# Patient Record
Sex: FEMALE | Race: BLACK OR AFRICAN AMERICAN | Employment: FULL TIME | URBAN - METROPOLITAN AREA
[De-identification: names, ages, dates, MRNs, and addresses within clinical notes are randomized per-mention and may not be internally consistent; named-entity substitution may affect disease eponyms.]

---

## 2018-10-09 ENCOUNTER — HOSPITAL ENCOUNTER (OUTPATIENT)
Dept: PHYSICAL THERAPY | Age: 23
Discharge: HOME | End: 2018-10-09
Payer: OTHER GOVERNMENT

## 2018-10-09 PROCEDURE — 97162 PT EVAL MOD COMPLEX 30 MIN: CPT

## 2018-10-09 PROCEDURE — 97110 THERAPEUTIC EXERCISES: CPT

## 2018-10-09 NOTE — PROGRESS NOTES
41 [facility] PHYSICAL THERAPY AT [address] [place] [address], [address] [place] [address] Road - Phone: [phone]  Fax: [phone] PLAN OF CARE / STATEMENT OF MEDICAL NECESSITY FOR PHYSICAL THERAPY SERVICES Patient Name: Christina Johnson : 1995 Treatment Diagnosis: Right knee pain Medical 
 Diagnosis: Knee pain, right [M25.561] Chondromalacia patellae, right knee [M22.41] Onset Date: 4 months ago Referral Source: Shona Og MD Start of [facility]): 10/9/2018 Prior Hospitalization: See medical history Provider #: [id number] Prior Level of Function: Pain free with all ADLs Comorbidities: None reported by patient Medications: Verified on Patient Summary List  
The Plan of Care and following information is based on the information from the initial evaluation.  
================================================================================== Assessment / key information: Pt is a 25 yo female that presents to PT with a chief complaint of R knee pain that began about 4 months with no known mechanism of injury. Pt states that about 8 months ago she began working a new job that requires her to be on her feet all the time walking which she states she wasn't used to. He/she presents with pain ranging from 3-10/10, located on the right knee on the anterolateral aspect. Pain is made worse with standing for long periods of time, sitting for long periods of time, walking and turning around with walking. , better with at night and when she can rest it. Other notable subjective information: Pt reports that it gets stiff and she has to move it around, pt reports that it clicks and pops frequently, reports that it has given out when descending stairs.  Notable examination finding: ROM WNL but had pain at end range knee flexion, hip abduction strength 4-/5, (+) mcmurrays, (+) thessaly test, hypermobility of bilateral patellas, decreased quad activation on the R LE, gross RLE strength 4+/5. Pt will benefit from PT interventions to address the aforementioned deficits and allow pt to return to PLOF. Eval Complexity: History MEDIUM  Complexity : 1-2 comorbidities / personal factors will impact the outcome/ POC ;  Examination  MEDIUM Complexity : 3 Standardized tests and measures addressing body structure, function, activity limitation and / or participation in recreation ; Presentation LOW Complexity : Stable, uncomplicated ;  Decision Making MEDIUM Complexity : FOTO score of 26-74; Overall Complexity MEDIUM  
================================================================================== Problem List: pain affecting function, decrease strength, impaired gait/ balance, decrease ADL/ functional abilitiies and decrease activity tolerance Treatment Plan may include any combination of the following: Therapeutic exercise, Therapeutic activities, Neuromuscular re-education, Physical agent/modality, Gait/balance training, Manual therapy, Patient education, Self Care training and Functional mobility training Patient / Family readiness to learn indicated by: asking questions, trying to perform skills and interestPersons(s) to be included in education: patient (P) Barriers to Learning/Limitations: no 
Measures taken:   
Patient Goal (s): \"strength in the knee and have no pain\" Patient self reported health status: good Rehabilitation Potential: good ? Short Term Goals: To be accomplished in  3  weeks: 1. Pt will be independent and compliant with HEP to decrease pain, increase ROM and return pt to PLOF. 2. Pt will demonstrate good quadriceps activation to show improved quad control 3. 
? Long Term Goals: To be accomplished in  6  weeks: 
1. Increase score on FOTO by > or = to 70 points to demo an increase in functional activity tolerance with the LE. 2. Pt will note < or = 1/10 pain with all mobility to improve comfort with ADLs. 3. Pt will be able to perform all duties at work without increase in pain to return to prior level of function 4. Pt will demonstrate gross RLE strength of 5-/5 to improve with ADLs and return to prior level of function Frequency / Duration:   Patient to be seen  2  times per week for 6  weeks: 
Patient / Caregiver education and instruction: self care, activity modification and exercises G-Codes (GP): - Therapist Signature: Heather Alfaro PT Date: 10/9/2018 Certification Period: - Time: 7:09 PM  
=========================================================================================== I certify that the above Physical Therapy Services are being furnished while the patient is under my care. I agree with the treatment plan and certify that this therapy is necessary. Physician Signature:       Date:      Time:  Please sign and return to [facility] or you may fax the signed copy to [phone] . Thank you.

## 2018-10-09 NOTE — PROGRESS NOTES
PHYSICAL THERAPY - DAILY TREATMENT NOTE Patient Name: Radha Cano        Date: 10/9/2018 : 1995    Patient  Verified: Pamela Zurita Visit #:     Insurance: Payor: LISA / Plan: Anaid Turpin / Product Type: Devan Sree / In time: 4:10 Out time: 5:00 Total Treatment Time: 50 Medicare Time Tracking (below) Total Timed Codes (min):  - 1:1 Treatment Time:  -  
 
TREATMENT AREA/ DIAGNOSIS = Knee pain, right [M25.561] Chondromalacia patellae, right knee [M22.41] SUBJECTIVE Pain Level (on 0 to 10 scale):  5  / 10 Medication Changes/New allergies or changes in medical history, any new surgeries or procedures? NO    If yes, update Summary List  
Subjective Functional Status/Changes:  []  No changes reported See Eval   
 
OBJECTIVE Modalities Rationale:     decrease pain to improve patient's ability to perform ADLs without pain 
 
 min [] Estim, type/location:    
                                 []  att     []  unatt     []  w/US     []  w/ice    []  w/heat  
 min []  Mechanical Traction: type/lbs   
               []  pro   []  sup   []  int   []  cont    []  before manual    []  after manual  
 min []  Ultrasound, settings/location:    
 min []  Iontophoresis w/ dexamethasone, location:   
                                           []  take home patch       []  in clinic  
10 min [x]  Ice     []  Heat    location/position: Right knee  
 min []  Vasopneumatic Device, press/temp:   
 min []  Other:   
[] Skin assessment post-treatment (if applicable):   
[]  intact    []  redness- no adverse reaction    
[]redness  adverse reaction:     
 
 
10 min Therapeutic Exercise:  [x]  See flow sheet Rationale:      increase strength to improve the patients ability to perform ADLs without pain Throughout treatment session min Patient Education:  Yes    [x] Reviewed HEP []  Progressed/Changed HEP based on:      
 
Other Objective/Functional Measures: 
 
See Eval  
 Post Treatment Pain Level (on 0 to 10) scale:   3  / 10 ASSESSMENT Assessment/Changes in Function:  
 
See Eval 
  
[]  See Progress Note/Recertification Patient will continue to benefit from skilled PT services to modify and progress therapeutic interventions, address functional mobility deficits and address strength deficits to attain remaining goals. Progress toward goals / Updated goals: 
 
See Eval  
 
PLAN [x]  Upgrade activities as tolerated YES Continue plan of care  
[]  Discharge due to :   
[]  Other:   
 
Therapist: Stacy SpringerPT Date: 10/9/2018 Time: 7:10 PM  
 
  
No future appointments.

## 2018-10-22 ENCOUNTER — HOSPITAL ENCOUNTER (OUTPATIENT)
Dept: PHYSICAL THERAPY | Age: 23
Discharge: HOME | End: 2018-10-22
Payer: OTHER GOVERNMENT

## 2018-10-22 PROCEDURE — 97110 THERAPEUTIC EXERCISES: CPT

## 2018-10-29 ENCOUNTER — HOSPITAL ENCOUNTER (OUTPATIENT)
Dept: PHYSICAL THERAPY | Age: 23
Discharge: HOME | End: 2018-10-29
Payer: OTHER GOVERNMENT

## 2018-10-29 PROCEDURE — 97110 THERAPEUTIC EXERCISES: CPT

## 2018-10-29 NOTE — PROGRESS NOTES
PHYSICAL THERAPY - DAILY TREATMENT NOTE Patient Name: Denise Jones        Date: 10/29/2018 : 1995    Patient  Verified: Cherelle Soliman Visit #:   3   of   12  Insurance: Payor: LISA / Plan: Dagoberto Newell 74 / Product Type: Benito Crisrusty / In time: 5:55 Out time: 6:40 Total Treatment Time: 45 Medicare Time Tracking (below) Total Timed Codes (min):  - 1:1 Treatment Time:  -  
TREATMENT AREA/ DIAGNOSIS = Pain in right knee [M25.561] Chondromalacia patellae, right knee [M22.41] SUBJECTIVE Pain Level (on 0 to 10 scale):  3  / 10 Medication Changes/New allergies or changes in medical history, any new surgeries or procedures? NO    If yes, update Summary List  
Subjective Functional Status/Changes:  []  No changes reported Pt reports that she was really sore after last visit. Pt states it finally went away and is back to her normal level now. OBJECTIVE Modalities Rationale:     decrease pain to improve patient's ability to perform ADLs without pain 
 
 min [] Estim, type/location:   
                                 []  att     []  unatt     []  w/US     []  w/ice    []  w/heat 
 min []  Mechanical Traction: type/lbs   
               []  pro   []  sup   []  int   []  cont    []  before manual    []  after manual  
 min []  Ultrasound, settings/location:    
 min []  Iontophoresis w/ dexamethasone, location:   
                                           []  take home patch       []  in clinic  
10 min [x]  Ice     []  Heat    location/position:   
 min []  Vasopneumatic Device, press/temp:   
 min []  Other:   
[] Skin assessment post-treatment (if applicable):   
[]  intact    []  redness- no adverse reaction    
[]redness  adverse reaction:     
35 min Therapeutic Exercise:  [x]  See flow sheet Rationale:      increase strength to improve the patients ability to perform ADLs without pain Throughout treatment session min Patient Education:  Yes    [x] Reviewed HEP 
 []  Progressed/Changed HEP based on: Other Objective/Functional Measures: 
 
Pt had no increase in pain during treatment session. Initiated PHE Post Treatment Pain Level (on 0 to 10) scale:   0  / 10 ASSESSMENT Assessment/Changes in Function:  
 
Pt had increased pain after last visit so all exercises were decreased in reps today to help with activity tolerance. Progress back to original next time if patient tolerated it well. 
  
[]  See Progress Note/Recertification Patient will continue to benefit from skilled PT services to modify and progress therapeutic interventions, address functional mobility deficits, address ROM deficits, address strength deficits and analyze and address soft tissue restrictions to attain remaining goals. Progress toward goals / Updated goals: 
Continue with current treatment plan PLAN [x]  Upgrade activities as tolerated YES Continue plan of care  
[]  Discharge due to :   
[]  Other:   
 
Therapist: Franklin Archibald PT Date: 10/29/2018 Time:   
 
  
Future Appointments Date Time Provider Kasia Cheney 11/5/2018  6:00 PM [facility] AT [facility]  
11/12/2018  6:00 PM [facility] AT [facility]  
11/19/2018  6:00 PM [facility] AT [facility]  
11/26/2018  6:00 PM [facility] AT [facility]

## 2018-11-05 ENCOUNTER — HOSPITAL ENCOUNTER (OUTPATIENT)
Dept: PHYSICAL THERAPY | Age: 23
Discharge: HOME | End: 2018-11-05
Payer: OTHER GOVERNMENT

## 2018-11-05 PROCEDURE — 97110 THERAPEUTIC EXERCISES: CPT

## 2018-11-05 NOTE — PROGRESS NOTES
PHYSICAL THERAPY - DAILY TREATMENT NOTE Patient Name: Alexsandra Soria        Date: 2018 : 1995    Patient  Verified: Urszula Severino Visit #:     Insurance: Payor: LISA / Plan: Opal Rosales / Product Type: Se Ramon / In time: 6:00 Out time: 6:50 Total Treatment Time: 50 Medicare Time Tracking (below) Total Timed Codes (min):  - 1:1 Treatment Time:  -  
TREATMENT AREA/ DIAGNOSIS = Pain in right knee [M25.561] Chondromalacia patellae, right knee [M22.41] SUBJECTIVE Pain Level (on 0 to 10 scale):  2  / 10 Medication Changes/New allergies or changes in medical history, any new surgeries or procedures? NO    If yes, update Summary List  
Subjective Functional Status/Changes:  []  No changes reported Pt reports that she is still feeling stiff in the R knee and that it hasnt changed much OBJECTIVE Modalities Rationale:     decrease inflammation and decrease pain to improve patient's ability to perform ADLs without pain 
 
 min [] Estim, type/location:   
                                 []  att     []  unatt     []  w/US     []  w/ice    []  w/heat 
 min []  Mechanical Traction: type/lbs   
               []  pro   []  sup   []  int   []  cont    []  before manual    []  after manual  
 min []  Ultrasound, settings/location:    
 min []  Iontophoresis w/ dexamethasone, location:   
                                           []  take home patch       []  in clinic  
10 min [x]  Ice     []  Heat    location/position: r knee  
 min []  Vasopneumatic Device, press/temp:   
 min []  Other:   
[] Skin assessment post-treatment (if applicable):   
[]  intact    []  redness- no adverse reaction    
[]redness  adverse reaction:     
40 min Therapeutic Exercise:  [x]  See flow sheet Rationale:      increase strength, improve coordination and improve balance to improve the patients ability to perform ADLs without pain Throughout treatment session min Patient Education:  Yes    [x] Reviewed HEP []  Progressed/Changed HEP based on: Other Objective/Functional Measures: 
 
Pt had no increase in pain during treatment session Post Treatment Pain Level (on 0 to 10) scale:   0  / 10 ASSESSMENT Assessment/Changes in Function:  
 
Pt had increased sway with balance activities []  See Progress Note/Recertification Patient will continue to benefit from skilled PT services to modify and progress therapeutic interventions, address functional mobility deficits and address strength deficits to attain remaining goals. Progress toward goals / Updated goals: 
Continue with current treatment plan PLAN [x]  Upgrade activities as tolerated YES Continue plan of care  
[]  Discharge due to :   
[]  Other:   
 
Therapist: Ulysses Files ,PT Date: 11/5/2018 Time: 6:06 PM  
 
  
Future Appointments Date Time Provider Kasia Cheney 11/12/2018  6:00 PM [facility] AT [facility]  
11/19/2018  6:00 PM [facility] AT [facility]  
[facility]26[facility]2018  [facility]

## 2018-11-12 ENCOUNTER — HOSPITAL ENCOUNTER (OUTPATIENT)
Dept: PHYSICAL THERAPY | Age: 23
Discharge: HOME | End: 2018-11-12
Payer: OTHER GOVERNMENT

## 2018-11-12 PROCEDURE — 97110 THERAPEUTIC EXERCISES: CPT

## 2018-11-12 NOTE — PROGRESS NOTES
PHYSICAL THERAPY - DAILY TREATMENT NOTE Patient Name: Clemencia Molina        Date: 2018 : 1995    Patient  Verified: Victor Hugo Lebron Visit #:     Insurance: Payor: LISA / Plan: Dagoberto Newell 74 / Product Type: Gayatri Hands / In time: 6:00 Out time: 6:55 Total Treatment Time: 54 Medicare Time Tracking (below) Total Timed Codes (min):  - 1:1 Treatment Time:  -  
TREATMENT AREA/ DIAGNOSIS = Pain in right knee [M25.561] Chondromalacia patellae, right knee [M22.41] SUBJECTIVE Pain Level (on 0 to 10 scale):  0  / 10 Medication Changes/New allergies or changes in medical history, any new surgeries or procedures? NO    If yes, update Summary List  
Subjective Functional Status/Changes:  []  No changes reported Pt reports that she isnt having as much stiffness lately but when she does its pretty bad. OBJECTIVE Modalities Rationale:     decrease inflammation to improve patient's ability to perform ADLs without pain 
 
 min [] Estim, type/location:   
                                 []  att     []  unatt     []  w/US     []  w/ice    []  w/heat 
 min []  Mechanical Traction: type/lbs   
               []  pro   []  sup   []  int   []  cont    []  before manual    []  after manual  
 min []  Ultrasound, settings/location:    
 min []  Iontophoresis w/ dexamethasone, location:   
                                           []  take home patch       []  in clinic  
10 min [x]  Ice     []  Heat    location/position: R knee  
 min []  Vasopneumatic Device, press/temp:   
 min []  Other:   
[] Skin assessment post-treatment (if applicable):   
[]  intact    []  redness- no adverse reaction    
[]redness  adverse reaction:     
45 min Therapeutic Exercise:  [x] u See flow sheet Rationale:      increase ROM, increase strength and improve coordination to improve the patients ability to perform ADLs without pain Throughout treatment session min Patient Education:  Yes    [x] Reviewed HEP []  Progressed/Changed HEP based on: Other Objective/Functional Measures: 
 
Pt had some soreness at the end of treatment session Initiated standing hip hikes. Post Treatment Pain Level (on 0 to 10) scale:   4  / 10 ASSESSMENT Assessment/Changes in Function:  
 
Pt required cueing to avoid valgus stress at the knee during squatting activities. Pt has hypermobility of the R patella   
[]  See Progress Note/Recertification Patient will continue to benefit from skilled PT services to modify and progress therapeutic interventions, address functional mobility deficits, address strength deficits, analyze and address soft tissue restrictions and analyze and cue movement patterns to attain remaining goals. Progress toward goals / Updated goals: 
Continue with current treatment plan PLAN [x]  Upgrade activities as tolerated YES Continue plan of care  
[]  Discharge due to :   
[]  Other:   
 
Therapist: Lima CaldwellPT Date: 11/12/2018 Time: 6:12 PM  
 
  
Future Appointments Date Time Provider Kasia Cheney 11/19/2018  6:00 PM [facility] AT [address]  
11/26/2018  [facility] [place] [facility] [address]

## 2018-11-19 ENCOUNTER — HOSPITAL ENCOUNTER (OUTPATIENT)
Dept: PHYSICAL THERAPY | Age: 23
Discharge: HOME | End: 2018-11-19
Payer: OTHER GOVERNMENT

## 2018-11-19 PROCEDURE — 97110 THERAPEUTIC EXERCISES: CPT

## 2018-11-19 NOTE — PROGRESS NOTES
PHYSICAL THERAPY - DAILY TREATMENT NOTE Patient Name: Homero Has        Date: 2018 : 1995    Patient  Verified: Snow Costello Visit #:     Insurance: Payor: LISA / Plan: Vin Grijalva / Product Type: Jaime Hylan / In time: 5:55 Out time: 6:50 Total Treatment Time: 54 Medicare Time Tracking (below) Total Timed Codes (min):  - 1:1 Treatment Time:  -  
TREATMENT AREA/ DIAGNOSIS = Pain in right knee [M25.561] Chondromalacia patellae, right knee [M22.41] SUBJECTIVE Pain Level (on 0 to 10 scale):  3  / 10 Medication Changes/New allergies or changes in medical history, any new surgeries or procedures? NO    If yes, update Summary List  
Subjective Functional Status/Changes:  []  No changes reported Pt reports that she was really sore the next day after last visit. OBJECTIVE Modalities Rationale:     decrease pain to improve patient's ability to perform ADLs without pain 
 
 min [] Estim, type/location:   
                                 []  att     []  unatt     []  w/US     []  w/ice    []  w/heat 
 min []  Mechanical Traction: type/lbs   
               []  pro   []  sup   []  int   []  cont    []  before manual    []  after manual  
 min []  Ultrasound, settings/location:    
 min []  Iontophoresis w/ dexamethasone, location:   
                                           []  take home patch       []  in clinic  
10 min [x]  Ice     []  Heat    location/position: R knee  
 min []  Vasopneumatic Device, press/temp:   
 min []  Other:   
[] Skin assessment post-treatment (if applicable):   
[]  intact    []  redness- no adverse reaction    
[]redness  adverse reaction:     
 
45 min Therapeutic Exercise:  [x]  See flow sheet Rationale:      increase strength and improve coordination to improve the patients ability to perform ADLs without pain Throughout treatment session min Patient Education:  Yes    [x] Reviewed HEP 
 []  Progressed/Changed HEP based on: Other Objective/Functional Measures: 
 
Pt had some increased stiffness after exercises Post Treatment Pain Level (on 0 to 10) scale:   5.5  / 10 ASSESSMENT Assessment/Changes in Function:  
 
Pt required minimal cueing for proper mechanics with exercises. Did not progress exercises today due to increased pain after last visit. Holding progressions until pt responded better to current program.  
  
[]  See Progress Note/Recertification Patient will continue to benefit from skilled PT services to modify and progress therapeutic interventions, address functional mobility deficits, address strength deficits, analyze and address soft tissue restrictions and analyze and cue movement patterns to attain remaining goals. Progress toward goals / Updated goals: 
Continue with current treatment plan PLAN [x]  Upgrade activities as tolerated YES Continue plan of care  
[]  Discharge due to :   
[]  Other:   
 
Therapist: Ishaan LopezPT Date: 11/19/2018 Time: 5:58 PM  
 
  
Future Appointments Date Time Provider Kasia Cheney 11/19/2018  6:00 PM [facility] AT [facility]  
11/26/2018  6:00 PM [facility] AT [facility]

## 2018-11-26 ENCOUNTER — HOSPITAL ENCOUNTER (OUTPATIENT)
Dept: PHYSICAL THERAPY | Age: 23
Discharge: HOME | End: 2018-11-26
Payer: OTHER GOVERNMENT

## 2018-11-26 PROCEDURE — 97110 THERAPEUTIC EXERCISES: CPT

## 2018-12-03 ENCOUNTER — APPOINTMENT (OUTPATIENT)
Dept: PHYSICAL THERAPY | Age: 23
End: 2018-12-03
Payer: OTHER GOVERNMENT

## 2018-12-17 ENCOUNTER — APPOINTMENT (OUTPATIENT)
Dept: PHYSICAL THERAPY | Age: 23
End: 2018-12-17
Payer: OTHER GOVERNMENT

## 2018-12-26 ENCOUNTER — HOSPITAL ENCOUNTER (OUTPATIENT)
Dept: PHYSICAL THERAPY | Age: 23
Discharge: HOME | End: 2018-12-26
Payer: OTHER GOVERNMENT

## 2018-12-26 PROCEDURE — 97110 THERAPEUTIC EXERCISES: CPT

## 2018-12-26 NOTE — PROGRESS NOTES
PHYSICAL THERAPY - DAILY TREATMENT NOTE    Patient Name: Caroline Mention        Date: 2018  : 1995    Patient  Verified: YES  Visit #:   8   of   12  Insurance: Payor: LISA / Plan: Dagoberto Newell 74 / Product Type:  /      In time: 5:55 Out time: 6:50   Total Treatment Time: 55     Medicare Time Tracking (below)   Total Timed Codes (min):  - 1:1 Treatment Time:  -     TREATMENT AREA/ DIAGNOSIS = Pain in right knee [M25.561]  Chondromalacia patellae, right knee [M22.41]    SUBJECTIVE  Pain Level (on 0 to 10 scale): 0 / 10   Medication Changes/New allergies or changes in medical history, any new surgeries or procedures?     NO    If yes, update Summary List   Subjective Functional Status/Changes:  []  No changes reported     Functional improvement no pain   Functional limitation-      OBJECTIVE  Modalities Rationale:     decrease edema, decrease inflammation and decrease pain to improve patient's ability to perform ADLs without pain   min [] Estim, type/location:                                      []  att     []  unatt     []  w/US     []  w/ice    []  w/heat    min []  Mechanical Traction: type/lbs                   []  pro   []  sup   []  int   []  cont    []  before manual    []  after manual    min []  Ultrasound, settings/location:      min []  Iontophoresis w/ dexamethasone, location:                                               []  take home patch       []  in clinic   10 min [x]  Ice     []  Heat    location/position:     min []  Vasopneumatic Device, press/temp:     min []  Other:    [] Skin assessment post-treatment (if applicable):    []  intact    []  redness- no adverse reaction     []redness  adverse reaction:        45 min Therapeutic Exercise:  [x]  See flow sheet   Rationale:      increase ROM and increase strength to improve the patients ability to perform ADLs without pain          min Patient Education:  Yes    [x] Reviewed HEP   []  Progressed/Changed HEP based on:        Other Objective/Functional Measures:    See PN   Post Treatment Pain Level (on 0 to 10) scale:   0  / 10     ASSESSMENT  Assessment/Changes in Function:     See PN     []  See Progress Note/Recertification   Patient will continue to benefit from skilled PT services to modify and progress therapeutic interventions, address functional mobility deficits, address ROM deficits, address strength deficits, analyze and address soft tissue restrictions, analyze and cue movement patterns, analyze and modify body mechanics/ergonomics and assess and modify postural abnormalities to attain remaining goals. Progress toward goals / Updated goals:    See PN     PLAN  [x]  Upgrade activities as tolerated YES Continue plan of care   []  Discharge due to :    []  Other:      Therapist: Anil Pereyra PT    Date: 12/26/2018 Time: 6:50 PM        No future appointments.

## 2018-12-26 NOTE — PROGRESS NOTES
[address] Khanna [address]  [facility] AT [facility] [address]. Denice Reaves, [address]  Phone: [phone]  Fax: [phone]  PROGRESS NOTE  Patient Name: Marin Coughlin : 1995   Treatment/Medical Diagnosis: Pain in right knee [M25.561]  Chondromalacia patellae, right knee [M22.41]   Referral Source: Jane Lindquist MD     Date of Initial Visit: 10/9/18 Attended Visits: 8 Missed Visits: 1     SUMMARY OF TREATMENT  LE strengthening, incuding glute prep, dynamic stretching, PREs. Patient education on self care and HEP  CURRENT STATUS  The patient is much improved, with much less R knee pain. Her attendance has been inconsistent, with only 8 visits in almost 3 months. She still needs to work  onher form but she is able to do squats without pain. Goal/Measure of Progress Goal Met? 1. Increase FOTO score to >= 70, to indicate increased function   Status at last Eval: 53 Current Status: 45 no   2. Pt with <= 1/10 pain with ADLs   Status at last Eval: Pain up to 10/10 Current Status: Pain <=6/10 progressing   3. Able to perform all duties at work   Status at last Eval: Pain with work duties Current Status: Pain free work yes   4. Improve R LE strength to >=5-/5   Status at last Eval: 4-/5 glute strength Current Status: 5/5 LE strength yes     New Goals to be achieved in __4-5__  weeks:  1. Increase FOTO score to >= 70, to indicate increased function   2. Pt able to peform full functional deep squat without pain    3. All ADLs without R knee pain > 2/10   4. Independent with HEP     RECOMMENDATIONS  Continue PT 1-2x/week x 4-5 weeks  If you have any questions/comments please contact us directly at [phone]. Thank you for allowing us to assist in the care of your patient. Therapist Signature:  Anil Pereyra PT, MDT Date: 2018     Time: 6:58 PM   NOTE TO PHYSICIAN:  PLEASE COMPLETE THE ORDERS BELOW AND FAX TO   [facility] [place]: [phone]. If you are unable to process this request in 24 hours please contact our office: [phone].    ___ I have read the above report and request that my patient continue as recommended.   ___ I have read the above report and request that my patient continue therapy with the following changes/special instructions:_________________________________________________________   ___ I have read the above report and request that my patient be discharged from therapy.      Physician Signature:        Date:       Time:

## 2019-01-08 ENCOUNTER — HOSPITAL ENCOUNTER (OUTPATIENT)
Dept: PHYSICAL THERAPY | Age: 24
End: 2019-01-08
Payer: OTHER GOVERNMENT

## 2019-01-21 ENCOUNTER — HOSPITAL ENCOUNTER (OUTPATIENT)
Dept: PHYSICAL THERAPY | Age: 24
Discharge: HOME | End: 2019-01-21
Payer: OTHER GOVERNMENT

## 2019-01-21 PROCEDURE — 97110 THERAPEUTIC EXERCISES: CPT

## 2019-01-21 NOTE — PROGRESS NOTES
Rhea Khanna [address] [facility] PHYSICAL THERAPY AT [address] [facility], [address] [place] [address], [address] [place] [address], [address] [place] [address]  Phone: [phone]  Fax: [phone] PROGRESS NOTE Patient Name: Lorenz Sever : 1995 Treatment/Medical Diagnosis: Pain in right knee [M25.561] Chondromalacia patellae, right knee [M22.41] Referral Source: Maeve Linder MD    
Date of Initial Visit: 10/9/2018 Attended Visits: 9 Missed Visits: 2 SUMMARY OF TREATMENT 
PT consists of LE strengthening, incuding glute prep, dynamic stretching, PREs. Patient education on self care and HEP CURRENT STATUS Pt reports that she is having much less knee pain. Pt states that her functional squat has been much less aggravating. Her attendance has been inconsistent, with only 9 visits in over 3 months. Pt would benefit from more consistent PT but has trouble with scheduling due to her work schedule. Pt has been educated on the importance of the HEP and self management due to lack of attendance. Goal/Measure of Progress Goal Met? 1. Increase FOTO score to >= 70, to indicate increased function Status at last Eval: 45 Current Status: 72 Progressing 2. Pt able to peform full functional deep squat without pain Status at last Eval: Some discomfort Current Status: Pain free but still requires mod cueing for mechanics Progressing 3. All ADLs without R knee pain > 2/10 Status at last Eval: 3 Current Status: 0 yes 4. Independent with HEP Status at last Eval: Not independent Current Status: Still progressing exercises Progressing New Goals to be achieved in __2__  weeks: 
1. Increase FOTO score to >= 70 to indicate increased function 2. Pt able to perform full functional deep squat independently 3. Independent with HEP  
 
RECOMMENDATIONS 
PT to continue therapy 1-2week for 2-3 weeks to become independent with progressing HEP in preparation for discharge If you have any questions/comments please contact us directly at [phone]. Thank you for allowing us to assist in the care of your patient. Therapist Signature: Marcus Carrizales Date: 1/21/2019 Time: 6:05 PM  
NOTE TO PHYSICIAN:  PLEASE COMPLETE THE ORDERS BELOW AND FAX TO [facility] Physical Therapy at [place]: [phone]. If you are unable to process this request in 24 hours please contact our office: [phone]. 
 
___ I have read the above report and request that my patient continue as recommended.  
___ I have read the above report and request that my patient continue therapy with the following changes/special instructions:_________________________________________________________  
___ I have read the above report and request that my patient be discharged from therapy.   
 
Physician Signature:       Date:      Time:

## 2019-01-21 NOTE — PROGRESS NOTES
PHYSICAL THERAPY - DAILY TREATMENT NOTE Patient Name: Jessica Hoffman        Date: 2019 : 1995    Patient  Verified: Mo Sanders Visit #:      [id number]  Insurance: Payor: LISA / Plan: Ludy Batres / Product Type: Creasie Decree / In time: 6:00 Out time: 6:40 Total Treatment Time: 40 Medicare Time Tracking (below) Total Timed Codes (min):  - 1:1 Treatment Time:  -  
TREATMENT AREA/ DIAGNOSIS = Pain in right knee [M25.561] Chondromalacia patellae, right knee [M22.41] SUBJECTIVE Pain Level (on 0 to 10 scale):  0-1  / 10 Medication Changes/New allergies or changes in medical history, any new surgeries or procedures? NO    If yes, update Summary List  
Subjective Functional Status/Changes:  []  No changes reported See PN OBJECTIVE 40 min Therapeutic Exercise:  [x]  See flow sheet Rationale:      increase strength and improve coordination to improve the patients ability to perform ADLs without pain Billed With/As: 
 [x] TE 
 [] TA 
 [] Neuro 
 [] Self Care Patient Education: [x] Review HEP [] Progressed/Changed HEP based on:  
[] positioning   [] body mechanics   [] transfers   [] heat/ice application   
[] other:   
Other Objective/Functional Measures: 
 
See PN Post Treatment Pain Level (on 0 to 10) scale:   0  / 10 ASSESSMENT Assessment/Changes in Function:  
 
See PN 
  
[]  See Progress Note/Recertification Patient will continue to benefit from skilled PT services to modify and progress therapeutic interventions, address functional mobility deficits, address ROM deficits and address strength deficits to attain remaining goals. Progress toward goals / Updated goals: 
See PN  
 
PLAN [x]  Upgrade activities as tolerated YES Continue plan of care  
[]  Discharge due to :   
[]  Other:   
 
Therapist: Kimberly Jose DPT Date: 2019 Time: 6:06 PM  
 
  
Future Appointments Date Time Provider Kasia Cheney 2/5/2019  4:00 PM [facility]  
2/11/2019  6:00 PM [facility]  
2/13/2019  6:00 PM [facility]

## 2019-02-05 ENCOUNTER — HOSPITAL ENCOUNTER (OUTPATIENT)
Dept: PHYSICAL THERAPY | Age: 24
End: 2019-02-05

## 2019-02-11 ENCOUNTER — APPOINTMENT (OUTPATIENT)
Dept: PHYSICAL THERAPY | Age: 24
End: 2019-02-11

## 2019-02-13 ENCOUNTER — APPOINTMENT (OUTPATIENT)
Dept: PHYSICAL THERAPY | Age: 24
End: 2019-02-13

## 2019-06-20 NOTE — PROGRESS NOTES
[address]  [facility] [address] [facility], [address] [place] [address], [address] [place] [address], [address] [place] [address]  Phone: [phone]  Fax: [phone] SUMMARY  Patient Name: Celia Tapia : 1995   Treatment/Medical Diagnosis: Pain in right knee [M25.561]  Chondromalacia patellae, right knee [M22.41]   Referral Source: Meli Rodriguez MD     Date of Initial Visit: 10/9/2019 Attended Visits: 9 Missed Visits: 3     SUMMARY OF TREATMENT  PT consisted of therapeutic exercise and modalities to improve strength and decrease pain  CURRENT STATUS  Pt was seen for a total of 9 visits over the course of 3 months due to scheduling conflicts and cancellations. Pt called and cancelled remaining appointments after last follow-up. Pt reported 0-1/10 pain in the R knee. Pt able to perform all exercises in the clinic with no exacerbation in pain. Pt given HEP and is independent with exercises. Goal/Measure of Progress Goal Met? 1. Increase FOTO score to >= 70, to indicate increased function   Status at last Eval: 45 Current Status: 65 no   2. Pt able to peform full functional deep squat without pain    Status at last Eval: some discomfort Current Status: pain free yes   3. All ADLs without R knee pain > 2/10   Status at last Eval: 3 Current Status: 0 yes   4. Independent with HEP          Status at last Eval: No Current Status: Yes yes     RECOMMENDATIONS  Discontinue therapy due to lack of attendance or compliance. If you have any questions/comments please contact us directly at [phone]. Thank you for allowing us to assist in the care of your patient.     Therapist Signature: Lisseth Linton Date: 2019     Time: 1:30 PM

## 2019-12-10 NOTE — PROGRESS NOTES
Addended by: Rene Elise on: 12/10/2019 01:04 PM     Modules accepted: Orders PHYSICAL THERAPY - DAILY TREATMENT NOTE Patient Name: Haroldo Castillo        Date: 2018 : 1995    Patient  Verified: Francisco John Visit #:     Insurance: Payor: LISA / Plan: Kaylah Ulloa / Product Type: Fausto Colon / In time: 6:00 Out time: 6:55 Total Treatment Time: 54 Medicare Time Tracking (below) Total Timed Codes (min):  - 1:1 Treatment Time:  -  
TREATMENT AREA/ DIAGNOSIS = Pain in right knee [M25.561] Chondromalacia patellae, right knee [M22.41] SUBJECTIVE Pain Level (on 0 to 10 scale):  0  / 10 Medication Changes/New allergies or changes in medical history, any new surgeries or procedures? NO    If yes, update Summary List  
Subjective Functional Status/Changes:  []  No changes reported Pt reports that she hasnt had any pain during the past week. Pt reports that she hasnt had that happen in a very long time. OBJECTIVE Modalities Rationale:     decrease inflammation to improve patient's ability to perform ADLs without pain 
 
 min [] Estim, type/location:   
                                 []  att     []  unatt     []  w/US     []  w/ice    []  w/heat 
 min []  Mechanical Traction: type/lbs   
               []  pro   []  sup   []  int   []  cont    []  before manual    []  after manual  
 min []  Ultrasound, settings/location:    
 min []  Iontophoresis w/ dexamethasone, location:   
                                           []  take home patch       []  in clinic  
10 min [x]  Ice     []  Heat    location/position: R knee  
 min []  Vasopneumatic Device, press/temp:   
 min []  Other:   
[] Skin assessment post-treatment (if applicable):   
[]  intact    []  redness- no adverse reaction    
[]redness  adverse reaction:     45 min Therapeutic Exercise:  [x]  See flow sheet Rationale:      increase strength and improve coordination to improve the patients ability to perform ADLs without pain Throughout treatment session min Patient Education:  Yes    [x] Reviewed HEP []  Progressed/Changed HEP based on: Other Objective/Functional Measures: 
 
Pt had no increase in pain during treatment session Post Treatment Pain Level (on 0 to 10) scale:   0  / 10 ASSESSMENT Assessment/Changes in Function:  
 
Did no add any additional exercises to program due to pt's positive response to current program. Pt also showing improved mechanics with squatting mechanics. []  See Progress Note/Recertification Patient will continue to benefit from skilled PT services to modify and progress therapeutic interventions, address functional mobility deficits, address strength deficits, analyze and cue movement patterns and analyze and modify body mechanics/ergonomics to attain remaining goals. Progress toward goals / Updated goals: 
Continue with current treatment plan PLAN [x]  Upgrade activities as tolerated YES Continue plan of care  
[]  Discharge due to :   
[]  Other:   
 
Therapist: Sage MonzonPT Date: 11/26/2018 Time: 6:29 PM  
 
  
No future appointments.